# Patient Record
Sex: FEMALE | Race: WHITE | NOT HISPANIC OR LATINO
[De-identification: names, ages, dates, MRNs, and addresses within clinical notes are randomized per-mention and may not be internally consistent; named-entity substitution may affect disease eponyms.]

---

## 2017-03-30 ENCOUNTER — RECORDS - HEALTHEAST (OUTPATIENT)
Dept: ADMINISTRATIVE | Facility: OTHER | Age: 22
End: 2017-03-30

## 2023-01-30 ENCOUNTER — HEALTH MAINTENANCE LETTER (OUTPATIENT)
Age: 28
End: 2023-01-30

## 2024-01-30 ENCOUNTER — OFFICE VISIT (OUTPATIENT)
Dept: OPTOMETRY | Facility: CLINIC | Age: 29
End: 2024-01-30
Payer: COMMERCIAL

## 2024-01-30 DIAGNOSIS — H52.229 MYOPIA WITH REGULAR ASTIGMATISM: Primary | ICD-10-CM

## 2024-01-30 DIAGNOSIS — H52.10 MYOPIA WITH REGULAR ASTIGMATISM: Primary | ICD-10-CM

## 2024-01-30 ASSESSMENT — CONF VISUAL FIELD
OS_SUPERIOR_NASAL_RESTRICTION: 0
METHOD: COUNTING FINGERS
OS_NORMAL: 1
OD_INFERIOR_TEMPORAL_RESTRICTION: 0
OD_NORMAL: 1
OS_INFERIOR_NASAL_RESTRICTION: 0
OD_SUPERIOR_TEMPORAL_RESTRICTION: 0
OS_INFERIOR_TEMPORAL_RESTRICTION: 0
OD_SUPERIOR_NASAL_RESTRICTION: 0
OS_SUPERIOR_TEMPORAL_RESTRICTION: 0
OD_INFERIOR_NASAL_RESTRICTION: 0

## 2024-01-30 ASSESSMENT — SLIT LAMP EXAM - LIDS
COMMENTS: NORMAL
COMMENTS: NORMAL

## 2024-01-30 ASSESSMENT — REFRACTION_WEARINGRX
OS_AXIS: 094
OS_SPHERE: -2.50
OD_CYLINDER: +2.00
OD_AXIS: 075
OD_SPHERE: -3.25
OS_CYLINDER: +1.00

## 2024-01-30 ASSESSMENT — REFRACTION_MANIFEST
OD_AXIS: 077
OD_CYLINDER: +1.50
OS_CYLINDER: +1.00
OS_SPHERE: -2.50
OD_SPHERE: -3.25
OS_AXIS: 097

## 2024-01-30 ASSESSMENT — VISUAL ACUITY
OS_CC: 20/25
METHOD: SNELLEN - LINEAR
CORRECTION_TYPE: GLASSES
OD_CC: 20/20

## 2024-01-30 ASSESSMENT — CUP TO DISC RATIO
OD_RATIO: 0.20
OS_RATIO: 0.20

## 2024-01-30 ASSESSMENT — EXTERNAL EXAM - LEFT EYE: OS_EXAM: NORMAL

## 2024-01-30 ASSESSMENT — TONOMETRY
OD_IOP_MMHG: 21
IOP_METHOD: ICARE
OS_IOP_MMHG: 21

## 2024-01-30 ASSESSMENT — EXTERNAL EXAM - RIGHT EYE: OD_EXAM: NORMAL

## 2024-01-30 NOTE — NURSING NOTE
Chief Complaints and History of Present Illnesses   Patient presents with    Annual Eye Exam     Pt here for new adult annual eye exam.     Chief Complaint(s) and History of Present Illness(es)       Annual Eye Exam              Laterality: both eyes    Comments: Pt here for new adult annual eye exam.              Comments    Pt last eye exam was 6 years ago. Pt has largely unchanged vision since last exam. No concerns.   Veronica Freeman, COA on 1/30/2024 at 10:51 AM

## 2024-01-30 NOTE — PROGRESS NOTES
A/P  1.) Myopia/Astigmatism OU  -Largely stable spec Rx, updated today  -Longstanding decreased VA left eye (20/25), possibly patched as a child?  -Dilated ocular health unremarkable OU    Monitor 1-2 years comprehensive, sooner prn    I have confirmed the patient's CC, HPI and reviewed Past Medical History, Past Surgical History, Social History, Family History, Problem List, Medication List and agree with Tech note.     Barbara Cordoba, OD FAAO FSLS

## 2024-03-02 ENCOUNTER — HEALTH MAINTENANCE LETTER (OUTPATIENT)
Age: 29
End: 2024-03-02

## 2025-03-15 ENCOUNTER — HEALTH MAINTENANCE LETTER (OUTPATIENT)
Age: 30
End: 2025-03-15